# Patient Record
Sex: MALE | Race: WHITE | NOT HISPANIC OR LATINO | Employment: FULL TIME | RURAL
[De-identification: names, ages, dates, MRNs, and addresses within clinical notes are randomized per-mention and may not be internally consistent; named-entity substitution may affect disease eponyms.]

---

## 2024-02-28 ENCOUNTER — OFFICE VISIT (OUTPATIENT)
Dept: FAMILY MEDICINE CLINIC | Facility: CLINIC | Age: 24
End: 2024-02-28
Payer: COMMERCIAL

## 2024-02-28 VITALS
WEIGHT: 153.4 LBS | BODY MASS INDEX: 23.25 KG/M2 | HEART RATE: 101 BPM | SYSTOLIC BLOOD PRESSURE: 144 MMHG | DIASTOLIC BLOOD PRESSURE: 82 MMHG | HEIGHT: 68 IN | OXYGEN SATURATION: 98 %

## 2024-02-28 DIAGNOSIS — R94.31 ABNORMAL ECG: Primary | ICD-10-CM

## 2024-02-28 DIAGNOSIS — Z87.74 S/P VSD REPAIR: ICD-10-CM

## 2024-02-28 PROCEDURE — 93000 ELECTROCARDIOGRAM COMPLETE: CPT | Performed by: INTERNAL MEDICINE

## 2024-02-28 PROCEDURE — 99203 OFFICE O/P NEW LOW 30 MIN: CPT | Performed by: INTERNAL MEDICINE

## 2024-02-28 NOTE — PROGRESS NOTES
Office Note     Name: Joe Guzman    : 2000     MRN: [id number]     Chief Complaint  Establish Care (Pt is here to establish care today. He had heart surgery at age 13. He is currently trying to get a job a [employer] and after doing a health screening they can not clear him for work until seeing provider for abnormal EKG and heart history. )    Subjective     History of Present Illness:  Joe Guzman is a 23 y.o. male who presents today for:      Has VSD closure at age 13 via open heart surgery, had a small leak briefly post up at the VSD but is assuming it closed because he stopped going, but thinks he may have been lost to followup due to moving.  Was diagnosed due to recurrent chest pain as a child/teen, they did the imaging and did the procedure later that year.    Does not get any shortness of breath or chest pain. He does occasionally get palpitations every couples months only when nervous or excited but otherwise does not have any signficant heart racing    Has history of asthma which occasionally flares       Review of Systems:   Review of Systems    Past Medical History:   Past Medical History:   Diagnosis Date    Asthma     Heart murmur        Past Surgical History:   Past Surgical History:   Procedure Laterality Date    CARDIAC SURGERY      Ventricular septal defect, removal of extra muscle mass, murmur    EYE SURGERY      RETINAL DETACHMENT REPAIR          VSD REPAIR             Family History:   Family History   Problem Relation Age of Onset    Crohn's disease Mother     Hypertension Mother     Diabetes Mother     Asthma Mother     Heart disease Maternal Grandmother        Social History:   Social History     Socioeconomic History    Marital status: Single   Tobacco Use    Smoking status: Never    Smokeless tobacco: Never   Vaping Use    Vaping status: Never Used   Substance and Sexual Activity    Alcohol use: Yes     Comment: Rarely    Drug use: Yes     Types: Marijuana  "   Sexual activity: Not Currently     Partners: Male       Immunizations:   Immunization History   Administered Date(s) Administered    COVID-19 (MODERNA) 1st,2nd,3rd Dose Monovalent 12/27/2021    DTaP, Unspecified 02/17/2000, 04/06/2000, 07/07/2000, 02/13/2001, 12/17/2003    Hep B / HiB 02/17/2000, 04/06/2000, 07/07/2000    Hib (PRP-OMP) 02/13/2001    IPV 02/17/2000, 04/06/2000, 11/27/2000, 12/17/2003    MCV4 Unspecified 03/03/2011, 02/12/2015    MMR 11/27/2000, 12/17/2003    PEDS-Pneumococcal Conjugate (PCV7) 11/27/2000, 02/13/2001, 05/14/2001    Tdap 03/03/2011    Trumenba(meningococcal B) 02/09/2016    Varicella 11/27/2000, 02/12/2015        Medications:   No current outpatient medications on file.    Allergies:   Allergies   Allergen Reactions    Amoxicillin Other (See Comments)     Pt reported        Objective     Vital Signs  /82   Pulse 101   Ht 172.7 cm (68\")   Wt 69.6 kg (153 lb 6.4 oz)   SpO2 98%   BMI 23.32 kg/m²   Estimated body mass index is 23.32 kg/m² as calculated from the following:    Height as of this encounter: 172.7 cm (68\").    Weight as of this encounter: 69.6 kg (153 lb 6.4 oz).    BMI is within normal parameters. No other follow-up for BMI required.      Physical Exam  Vitals and nursing note reviewed.   Constitutional:       Appearance: Normal appearance.   Cardiovascular:      Rate and Rhythm: Normal rate and regular rhythm.      Heart sounds: No murmur heard.     No friction rub. No gallop.   Pulmonary:      Effort: Pulmonary effort is normal.      Breath sounds: Normal breath sounds. No wheezing, rhonchi or rales.   Skin:     Capillary Refill: Capillary refill takes less than 2 seconds.   Neurological:      Mental Status: He is alert.            Procedures     Assessment and Plan     1. Abnormal ECG    - ECG 12 Lead  - Ambulatory Referral to Cardiology  - Adult Transthoracic Echo Complete W/ Cont if Necessary Per Protocol; Future    2. S/P VSD repair    - ECG 12 Lead  - " Ambulatory Referral to Cardiology  - Adult Transthoracic Echo Complete W/ Cont if Necessary Per Protocol; Future     EKG brought in by patient shows sinus arrthmia, possble RVH and LVH  Follow Up  Return if symptoms worsen or fail to improve.    Patient was advised to call the office or seek medical care if  any issues discussed during this visit worsen or persist or if new concerns arise        MD ROVERTO [facility]  [address] [place] [id number] [phone]

## 2024-03-01 ENCOUNTER — TELEPHONE (OUTPATIENT)
Dept: CARDIOLOGY | Facility: CLINIC | Age: 24
End: 2024-03-01
Payer: COMMERCIAL

## 2024-03-18 ENCOUNTER — OFFICE VISIT (OUTPATIENT)
Dept: CARDIOLOGY | Facility: CLINIC | Age: 24
End: 2024-03-18
Payer: COMMERCIAL

## 2024-03-18 VITALS
OXYGEN SATURATION: 99 % | DIASTOLIC BLOOD PRESSURE: 60 MMHG | WEIGHT: 152 LBS | HEART RATE: 90 BPM | BODY MASS INDEX: 23.04 KG/M2 | SYSTOLIC BLOOD PRESSURE: 92 MMHG | HEIGHT: 68 IN | RESPIRATION RATE: 16 BRPM

## 2024-03-18 DIAGNOSIS — Q21.0 VSD (VENTRICULAR SEPTAL DEFECT), SINGLE: ICD-10-CM

## 2024-03-18 DIAGNOSIS — Q21.10 ASD (ATRIAL SEPTAL DEFECT): Primary | ICD-10-CM

## 2024-03-18 PROBLEM — Z82.79 FAMILY HISTORY OF VSD (VENTRICULAR SEPTAL DEFECT): Status: RESOLVED | Noted: 2024-03-18 | Resolved: 2024-03-18

## 2024-03-18 PROBLEM — Z82.79 FAMILY HISTORY OF VSD (VENTRICULAR SEPTAL DEFECT): Status: ACTIVE | Noted: 2024-03-18

## 2024-03-18 PROCEDURE — 99203 OFFICE O/P NEW LOW 30 MIN: CPT | Performed by: INTERNAL MEDICINE

## 2024-03-18 PROCEDURE — 93000 ELECTROCARDIOGRAM COMPLETE: CPT | Performed by: INTERNAL MEDICINE

## 2024-03-18 NOTE — PROGRESS NOTES
[facility]  [facility] CARDIOLOGY  [address] [place] [address]  Dept: [phone]  Dept Fax: [phone]    Joe Guzman  2000    New Patient Office Note    History of Present Illness:  Joe Guzman is a 23 y.o. 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 I think he can go to work, without restrictions, will try to get records from [facility] and  also will try to get records from , his echo showed normal Ef, questionable abnormal aortic valve although seems more limitations during the scanning, will review records, no contraindication for work, he also was advised to use endocarditis prophylaxis before dental procedure, his bubble study showed late few bubbles going through Ventricular septum, he remembers also was told that he has a little leak , will decide after reviewing records if he needs congenital heart disease specialist    The following portions of the patient's history were reviewed and updated as appropriate: allergies, current medications, past family history, past medical history, past social history, past surgical history, and problem list.    Medications:  This patient does not have an active medication from one of the medication groupers.    Subjective  Allergies   Allergen Reactions    Amoxicillin Other (See Comments)     Pt reported         Past Medical History:   Diagnosis Date    Asthma     Heart murmur   "      Past Surgical History:   Procedure Laterality Date    CARDIAC SURGERY  2014    Ventricular septal defect, removal of extra muscle mass, murmur    EYE SURGERY  2011    RETINAL DETACHMENT REPAIR      2011    VSD REPAIR      2014       Family History   Problem Relation Age of Onset    Crohn's disease Mother     Hypertension Mother     Diabetes Mother     Asthma Mother     Heart disease Maternal Grandmother         Social History     Socioeconomic History    Marital status: Single   Tobacco Use    Smoking status: Never    Smokeless tobacco: Never   Vaping Use    Vaping status: Never Used   Substance and Sexual Activity    Alcohol use: Yes     Comment: Rarely    Drug use: Yes     Types: Marijuana    Sexual activity: Not Currently     Partners: Male       Review of Systems   Constitutional: Negative.    HENT: Negative.     Respiratory: Negative.     Cardiovascular: Negative.    Endocrine: Negative.    Genitourinary: Negative.    Musculoskeletal: Negative.    Skin: Negative.    Allergic/Immunologic: Negative.    Neurological: Negative.    Hematological: Negative.    Psychiatric/Behavioral: Negative.         Cardiovascular Procedures    ECHO/MUGA:  STRESS TESTS:   CARDIAC CATH:   DEVICES:   HOLTER:   CT/MRI:   VASCULAR:   CARDIOTHORACIC:     Objective  Vitals:    03/18/24 1518   BP: 92/60   BP Location: Right arm   Patient Position: Lying   Cuff Size: Adult   Pulse: 90   Resp: 16   SpO2: 99%   Weight: 68.9 kg (152 lb)   Height: 172.7 cm (68\")   PainSc: 0-No pain       Physical Exam  Constitutional:       Appearance: Healthy appearance. Not in distress.   Neck:      Vascular: No JVR. JVD normal.   Pulmonary:      Effort: Pulmonary effort is normal.      Breath sounds: Normal breath sounds. No wheezing. No rhonchi. No rales.   Chest:      Chest wall: Not tender to palpatation.   Cardiovascular:      PMI at left midclavicular line. Normal rate. Regular rhythm. Normal S1. Normal S2.       Murmurs: There is a harsh " holosystolic murmur at the LLSB, radiating to the LRSB.      No gallop.  No click. No rub.   Pulses:     Intact distal pulses.   Edema:     Peripheral edema absent.   Abdominal:      General: Bowel sounds are normal.      Palpations: Abdomen is soft.      Tenderness: There is no abdominal tenderness.   Musculoskeletal: Normal range of motion.         General: No tenderness. Skin:     General: Skin is warm and dry.   Neurological:      General: No focal deficit present.      Mental Status: Alert and oriented to person, place and time.        Diagnostic Data    ECG 12 Lead    Date/Time: 3/18/2024 4:36 PM  Performed by: Gilbert Cox MD    Authorized by: Charo Mcgill MD  Comparison: compared with previous ECG from 2/26/2024  Similar to previous ECG  Rhythm: sinus rhythm  Rate: normal  BPM: 67  Conduction: right bundle branch block  QRS axis: normal    Clinical impression: abnormal EKG        Assessment and Plan  Congenital heart disease- VSD- ASD- DORCD- will get records asymptomatic, bubble study showed mild later bubbles in the LV , no contraindications to work, will try to get records from [facility] as well as [facility] in about 1 year (around 3/18/2025) for Recheck with Dr. Cox.    Gilbert Cox MD  03/18/2024

## 2024-09-19 ENCOUNTER — TELEPHONE (OUTPATIENT)
Dept: FAMILY MEDICINE CLINIC | Facility: CLINIC | Age: 24
End: 2024-09-19
Payer: COMMERCIAL